# Patient Record
Sex: MALE | Race: BLACK OR AFRICAN AMERICAN | NOT HISPANIC OR LATINO | Employment: UNEMPLOYED | URBAN - METROPOLITAN AREA
[De-identification: names, ages, dates, MRNs, and addresses within clinical notes are randomized per-mention and may not be internally consistent; named-entity substitution may affect disease eponyms.]

---

## 2022-04-29 ENCOUNTER — TELEPHONE (OUTPATIENT)
Dept: SURGERY | Facility: CLINIC | Age: 62
End: 2022-04-29

## 2022-06-07 ENCOUNTER — OFFICE VISIT (OUTPATIENT)
Dept: SURGERY | Facility: CLINIC | Age: 62
End: 2022-06-07
Payer: OTHER GOVERNMENT

## 2022-06-07 VITALS
HEART RATE: 77 BPM | RESPIRATION RATE: 16 BRPM | DIASTOLIC BLOOD PRESSURE: 86 MMHG | TEMPERATURE: 98 F | BODY MASS INDEX: 30.62 KG/M2 | WEIGHT: 202 LBS | SYSTOLIC BLOOD PRESSURE: 128 MMHG | OXYGEN SATURATION: 96 % | HEIGHT: 68 IN

## 2022-06-07 DIAGNOSIS — K59.00 CONSTIPATION, UNSPECIFIED CONSTIPATION TYPE: ICD-10-CM

## 2022-06-07 DIAGNOSIS — Z87.898 HISTORY OF POSTOPERATIVE NAUSEA AND VOMITING: ICD-10-CM

## 2022-06-07 DIAGNOSIS — K42.9 UMBILICAL HERNIA WITHOUT OBSTRUCTION AND WITHOUT GANGRENE: Primary | ICD-10-CM

## 2022-06-07 PROCEDURE — 99214 OFFICE O/P EST MOD 30 MIN: CPT | Performed by: SURGERY

## 2022-06-07 NOTE — PROGRESS NOTES
Assessment/Plan:     1  Umbilical hernia without obstruction and without gangrene  -     Case request operating room: HERNIA REPAIR UMBILICAL LAPAROSCOPIC; Standing  -     Comprehensive metabolic panel; Future  -     CBC and differential; Future  -     EKG 12 lead; Future  -     Case request operating room: HERNIA REPAIR UMBILICAL LAPAROSCOPIC    2  History of postoperative nausea and vomiting    3  Constipation, unspecified constipation type        Add bid colace 100mg in addition to the fiber supplement he has been prescribed  I assume this is at least partially dietary  We discussed the risks and benefits of laparoscopic umbilical hernia repair including but not limited to mesh placement to decrease risk of recurrence, risk of bleeding, infection, damage to nerves or other structures, acute and chronic pain, recurrence, reactions to medications  Questions answered and consent signed  He will need clearance which can be faxed over from his primary at the facility  Labs/EKG ordered  Subjective:      Patient ID: Corey Karimi is a 63 y o  male  Triage Notes:    Mr Siva Horowitz is presenting with an umbilical hernia  He has noticed it about 3 months  He has stopped doing the heavy lifting since noticing this but did previously lift up to  pounds  No recent cough - quit smoking in February due to his incarceration  Does endorse constipation and a lot of straining despite starting fiber he is still having to strain a lot  The following portions of the patient's history were reviewed and updated as appropriate:   He  has no past medical history on file  He There are no problems to display for this patient  He  has a past surgical history that includes 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)  His family history is not on file  He  reports that he has quit smoking  His smoking use included cigarettes  He has never used smokeless tobacco  He reports previous alcohol use   He reports that he does not use drugs  Current Outpatient Medications on File Prior to Visit   Medication Sig    albuterol (PROVENTIL HFA,VENTOLIN HFA) 90 mcg/act inhaler Inhale 2 puffs every 6 (six) hours as needed for wheezing    psyllium (METAMUCIL) 58 6 % powder Take 1 packet by mouth 3 (three) times a day     No current facility-administered medications on file prior to visit  He has No Known Allergies       Review of Systems   Constitutional: Negative for appetite change, chills, fever and unexpected weight change  HENT: Negative for hearing loss, sore throat, trouble swallowing and voice change  Eyes: Negative for visual disturbance  Respiratory: Negative for cough, chest tightness, shortness of breath and wheezing  Cardiovascular: Negative for chest pain and palpitations  Gastrointestinal: Positive for constipation  Negative for abdominal distention and blood in stool  Endocrine: Negative for cold intolerance and heat intolerance  Genitourinary: Negative for difficulty urinating  Skin: Negative for color change and rash  Neurological: Negative for dizziness, speech difficulty, light-headedness and numbness  Hematological: Does not bruise/bleed easily  Psychiatric/Behavioral: Negative for confusion, hallucinations and suicidal ideas  Objective:      /86   Pulse 77   Temp 98 °F (36 7 °C) (Temporal)   Resp 16   Ht 5' 8" (1 727 m)   Wt 91 6 kg (202 lb)   SpO2 96%   BMI 30 71 kg/m²     Below is the patient's most recent value for Albumin, ALT, AST, BUN, Calcium, Chloride, Cholesterol, CO2, Creatinine, GFR, Glucose, HDL, Hematocrit, Hemoglobin, Hemoglobin A1C, LDL, Magnesium, Phosphorus, Platelets, Potassium, PSA, Sodium, Triglycerides, and WBC     No results found for: ALT, AST, BUN, CALCIUM, CL, CHOL, CO2, CREATININE, CREAT, GFRAA, GFRNONAA, HDL, HCT, HGB, HGBA1C, LDL, MG, PHOS, PLT, K, PSA, NA, TRIG, WBC  Note: for a comprehensive list of the patient's lab results, access the Results Review activity  Physical Exam  Vitals and nursing note reviewed  Constitutional:       General: He is not in acute distress  Appearance: He is well-developed  HENT:      Head: Normocephalic and atraumatic  Eyes:      General: No scleral icterus  Right eye: No discharge  Left eye: No discharge  Neck:      Vascular: No JVD  Cardiovascular:      Rate and Rhythm: Normal rate and regular rhythm  Pulmonary:      Effort: Pulmonary effort is normal  No respiratory distress  Abdominal:      General: There is no distension  Palpations: Abdomen is soft  Tenderness: There is no abdominal tenderness  There is no guarding  Hernia: A hernia (can admit two fingerbreaths  reduces supine ) is present  Musculoskeletal:         General: Normal range of motion  Cervical back: Normal range of motion and neck supple  Skin:     General: Skin is warm and dry  Neurological:      Mental Status: He is alert and oriented to person, place, and time  Psychiatric:         Mood and Affect: Mood normal          Behavior: Behavior normal          Thought Content:  Thought content normal          Judgment: Judgment normal              Procedures

## 2022-06-07 NOTE — H&P (VIEW-ONLY)
Assessment/Plan:     1  Umbilical hernia without obstruction and without gangrene  -     Case request operating room: HERNIA REPAIR UMBILICAL LAPAROSCOPIC; Standing  -     Comprehensive metabolic panel; Future  -     CBC and differential; Future  -     EKG 12 lead; Future  -     Case request operating room: HERNIA REPAIR UMBILICAL LAPAROSCOPIC    2  History of postoperative nausea and vomiting    3  Constipation, unspecified constipation type        Add bid colace 100mg in addition to the fiber supplement he has been prescribed  I assume this is at least partially dietary  We discussed the risks and benefits of laparoscopic umbilical hernia repair including but not limited to mesh placement to decrease risk of recurrence, risk of bleeding, infection, damage to nerves or other structures, acute and chronic pain, recurrence, reactions to medications  Questions answered and consent signed  He will need clearance which can be faxed over from his primary at the facility  Labs/EKG ordered  Subjective:      Patient ID: Laurent Milton is a 63 y o  male  Triage Notes:    Mr Ton Saleh is presenting with an umbilical hernia  He has noticed it about 3 months  He has stopped doing the heavy lifting since noticing this but did previously lift up to  pounds  No recent cough - quit smoking in February due to his incarceration  Does endorse constipation and a lot of straining despite starting fiber he is still having to strain a lot  The following portions of the patient's history were reviewed and updated as appropriate:   He  has no past medical history on file  He There are no problems to display for this patient  He  has a past surgical history that includes 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)  His family history is not on file  He  reports that he has quit smoking  His smoking use included cigarettes  He has never used smokeless tobacco  He reports previous alcohol use   He reports that he does not use drugs  Current Outpatient Medications on File Prior to Visit   Medication Sig    albuterol (PROVENTIL HFA,VENTOLIN HFA) 90 mcg/act inhaler Inhale 2 puffs every 6 (six) hours as needed for wheezing    psyllium (METAMUCIL) 58 6 % powder Take 1 packet by mouth 3 (three) times a day     No current facility-administered medications on file prior to visit  He has No Known Allergies       Review of Systems   Constitutional: Negative for appetite change, chills, fever and unexpected weight change  HENT: Negative for hearing loss, sore throat, trouble swallowing and voice change  Eyes: Negative for visual disturbance  Respiratory: Negative for cough, chest tightness, shortness of breath and wheezing  Cardiovascular: Negative for chest pain and palpitations  Gastrointestinal: Positive for constipation  Negative for abdominal distention and blood in stool  Endocrine: Negative for cold intolerance and heat intolerance  Genitourinary: Negative for difficulty urinating  Skin: Negative for color change and rash  Neurological: Negative for dizziness, speech difficulty, light-headedness and numbness  Hematological: Does not bruise/bleed easily  Psychiatric/Behavioral: Negative for confusion, hallucinations and suicidal ideas  Objective:      /86   Pulse 77   Temp 98 °F (36 7 °C) (Temporal)   Resp 16   Ht 5' 8" (1 727 m)   Wt 91 6 kg (202 lb)   SpO2 96%   BMI 30 71 kg/m²     Below is the patient's most recent value for Albumin, ALT, AST, BUN, Calcium, Chloride, Cholesterol, CO2, Creatinine, GFR, Glucose, HDL, Hematocrit, Hemoglobin, Hemoglobin A1C, LDL, Magnesium, Phosphorus, Platelets, Potassium, PSA, Sodium, Triglycerides, and WBC     No results found for: ALT, AST, BUN, CALCIUM, CL, CHOL, CO2, CREATININE, CREAT, GFRAA, GFRNONAA, HDL, HCT, HGB, HGBA1C, LDL, MG, PHOS, PLT, K, PSA, NA, TRIG, WBC  Note: for a comprehensive list of the patient's lab results, access the Results Review activity  Physical Exam  Vitals and nursing note reviewed  Constitutional:       General: He is not in acute distress  Appearance: He is well-developed  HENT:      Head: Normocephalic and atraumatic  Eyes:      General: No scleral icterus  Right eye: No discharge  Left eye: No discharge  Neck:      Vascular: No JVD  Cardiovascular:      Rate and Rhythm: Normal rate and regular rhythm  Pulmonary:      Effort: Pulmonary effort is normal  No respiratory distress  Abdominal:      General: There is no distension  Palpations: Abdomen is soft  Tenderness: There is no abdominal tenderness  There is no guarding  Hernia: A hernia (can admit two fingerbreaths  reduces supine ) is present  Musculoskeletal:         General: Normal range of motion  Cervical back: Normal range of motion and neck supple  Skin:     General: Skin is warm and dry  Neurological:      Mental Status: He is alert and oriented to person, place, and time  Psychiatric:         Mood and Affect: Mood normal          Behavior: Behavior normal          Thought Content:  Thought content normal          Judgment: Judgment normal              Procedures

## 2022-06-16 NOTE — PRE-PROCEDURE INSTRUCTIONS
Pre-Surgery Instructions:   Medication Instructions    albuterol (PROVENTIL HFA,VENTOLIN HFA) 90 mcg/act inhaler Uses PRN- OK to take day of surgery    psyllium (METAMUCIL) 58 6 % powder Hold day of surgery  You will receive a phone call from hospital for arrival time  Please call surgeons office if any changes in your condition  Wear easy on/off clothing; consider type of surgery;  Valuables, jewelry, piercing's please keep at home  **COVID-19  education/surgical guidelines  Updated covid    Visitation policy  Please: No contact lenses or eye make up, artificial eyelashes    Please secure transportation     Follow pre surgery showering or cleaning instructions as  Reviewed by nurse or surgeons office      Questions answered and concerns addressed

## 2022-06-22 ENCOUNTER — HOSPITAL ENCOUNTER (OUTPATIENT)
Facility: HOSPITAL | Age: 62
Setting detail: OUTPATIENT SURGERY
Discharge: HOME | End: 2022-06-22
Attending: SURGERY | Admitting: SURGERY
Payer: OTHER GOVERNMENT

## 2022-06-22 ENCOUNTER — ANESTHESIA EVENT (OUTPATIENT)
Dept: PERIOP | Facility: HOSPITAL | Age: 62
End: 2022-06-22
Payer: OTHER GOVERNMENT

## 2022-06-22 ENCOUNTER — ANESTHESIA (OUTPATIENT)
Dept: PERIOP | Facility: HOSPITAL | Age: 62
End: 2022-06-22
Payer: OTHER GOVERNMENT

## 2022-06-22 ENCOUNTER — HOSPITAL ENCOUNTER (EMERGENCY)
Facility: HOSPITAL | Age: 62
Discharge: HOME | End: 2022-06-22
Attending: EMERGENCY MEDICINE
Payer: OTHER GOVERNMENT

## 2022-06-22 VITALS
WEIGHT: 209.44 LBS | OXYGEN SATURATION: 93 % | TEMPERATURE: 97.4 F | HEART RATE: 86 BPM | DIASTOLIC BLOOD PRESSURE: 78 MMHG | SYSTOLIC BLOOD PRESSURE: 146 MMHG | BODY MASS INDEX: 31.74 KG/M2 | RESPIRATION RATE: 18 BRPM | HEIGHT: 68 IN

## 2022-06-22 VITALS
OXYGEN SATURATION: 100 % | SYSTOLIC BLOOD PRESSURE: 151 MMHG | WEIGHT: 210 LBS | HEART RATE: 96 BPM | TEMPERATURE: 98.7 F | BODY MASS INDEX: 31.83 KG/M2 | HEIGHT: 68 IN | RESPIRATION RATE: 18 BRPM | DIASTOLIC BLOOD PRESSURE: 81 MMHG

## 2022-06-22 DIAGNOSIS — K43.9 VENTRAL HERNIA WITHOUT OBSTRUCTION OR GANGRENE: Primary | ICD-10-CM

## 2022-06-22 DIAGNOSIS — G89.18 POSTOPERATIVE PAIN: Primary | ICD-10-CM

## 2022-06-22 PROBLEM — Z98.890 PONV (POSTOPERATIVE NAUSEA AND VOMITING): Status: ACTIVE | Noted: 2022-06-22

## 2022-06-22 PROBLEM — R11.2 PONV (POSTOPERATIVE NAUSEA AND VOMITING): Status: ACTIVE | Noted: 2022-06-22

## 2022-06-22 PROCEDURE — 99024 POSTOP FOLLOW-UP VISIT: CPT

## 2022-06-22 PROCEDURE — 96372 THER/PROPH/DIAG INJ SC/IM: CPT

## 2022-06-22 PROCEDURE — 49652 PR LAP, VENTRAL HERNIA REPAIR,REDUCIBLE: CPT | Performed by: SURGERY

## 2022-06-22 PROCEDURE — C1781 MESH (IMPLANTABLE): HCPCS | Performed by: SURGERY

## 2022-06-22 PROCEDURE — 99284 EMERGENCY DEPT VISIT MOD MDM: CPT | Performed by: PHYSICIAN ASSISTANT

## 2022-06-22 PROCEDURE — 99284 EMERGENCY DEPT VISIT MOD MDM: CPT

## 2022-06-22 DEVICE — VENTRALIGHT ST MESH
Type: IMPLANTABLE DEVICE | Site: ABDOMEN | Status: FUNCTIONAL
Brand: VENTRALIGHT ST

## 2022-06-22 DEVICE — FIXATION SECURESTRAP 5 MM ABSORB DISP: Type: IMPLANTABLE DEVICE | Site: ABDOMEN | Status: FUNCTIONAL

## 2022-06-22 RX ORDER — PROMETHAZINE HYDROCHLORIDE 25 MG/ML
25 INJECTION, SOLUTION INTRAMUSCULAR; INTRAVENOUS ONCE AS NEEDED
Status: DISCONTINUED | OUTPATIENT
Start: 2022-06-22 | End: 2022-06-22 | Stop reason: HOSPADM

## 2022-06-22 RX ORDER — ACETAMINOPHEN 325 MG/1
650 TABLET ORAL EVERY 6 HOURS PRN
Status: ON HOLD | COMMUNITY
End: 2022-06-22 | Stop reason: SDUPTHER

## 2022-06-22 RX ORDER — HYDROCODONE BITARTRATE AND ACETAMINOPHEN 5; 325 MG/1; MG/1
1 TABLET ORAL EVERY 6 HOURS PRN
Qty: 16 TABLET | Refills: 0 | Status: SHIPPED | OUTPATIENT
Start: 2022-06-22 | End: 2022-06-26

## 2022-06-22 RX ORDER — FENTANYL CITRATE/PF 50 MCG/ML
50 SYRINGE (ML) INJECTION
Status: DISCONTINUED | OUTPATIENT
Start: 2022-06-22 | End: 2022-06-22 | Stop reason: HOSPADM

## 2022-06-22 RX ORDER — LIDOCAINE HYDROCHLORIDE 10 MG/ML
INJECTION, SOLUTION EPIDURAL; INFILTRATION; INTRACAUDAL; PERINEURAL AS NEEDED
Status: DISCONTINUED | OUTPATIENT
Start: 2022-06-22 | End: 2022-06-22

## 2022-06-22 RX ORDER — KETOROLAC TROMETHAMINE 30 MG/ML
30 INJECTION, SOLUTION INTRAMUSCULAR; INTRAVENOUS ONCE
Status: COMPLETED | OUTPATIENT
Start: 2022-06-22 | End: 2022-06-22

## 2022-06-22 RX ORDER — PHENYLEPHRINE HYDROCHLORIDE 10 MG/ML
INJECTION INTRAVENOUS AS NEEDED
Status: DISCONTINUED | OUTPATIENT
Start: 2022-06-22 | End: 2022-06-22

## 2022-06-22 RX ORDER — PHENYLEPHRINE HCL 10 MG/1
10 TABLET, FILM COATED ORAL EVERY 4 HOURS PRN
COMMUNITY

## 2022-06-22 RX ORDER — GLYCOPYRROLATE 0.2 MG/ML
INJECTION INTRAMUSCULAR; INTRAVENOUS AS NEEDED
Status: DISCONTINUED | OUTPATIENT
Start: 2022-06-22 | End: 2022-06-22

## 2022-06-22 RX ORDER — CYCLOBENZAPRINE HCL 5 MG
5 TABLET ORAL 3 TIMES DAILY PRN
Qty: 15 TABLET | Refills: 0 | Status: SHIPPED | OUTPATIENT
Start: 2022-06-22 | End: 2022-06-26

## 2022-06-22 RX ORDER — HYDROMORPHONE HCL 110MG/55ML
PATIENT CONTROLLED ANALGESIA SYRINGE INTRAVENOUS AS NEEDED
Status: DISCONTINUED | OUTPATIENT
Start: 2022-06-22 | End: 2022-06-22

## 2022-06-22 RX ORDER — DEXAMETHASONE SODIUM PHOSPHATE 10 MG/ML
INJECTION, SOLUTION INTRAMUSCULAR; INTRAVENOUS AS NEEDED
Status: DISCONTINUED | OUTPATIENT
Start: 2022-06-22 | End: 2022-06-22

## 2022-06-22 RX ORDER — METOCLOPRAMIDE HYDROCHLORIDE 5 MG/ML
10 INJECTION INTRAMUSCULAR; INTRAVENOUS ONCE AS NEEDED
Status: DISCONTINUED | OUTPATIENT
Start: 2022-06-22 | End: 2022-06-22 | Stop reason: HOSPADM

## 2022-06-22 RX ORDER — SODIUM CHLORIDE, SODIUM LACTATE, POTASSIUM CHLORIDE, CALCIUM CHLORIDE 600; 310; 30; 20 MG/100ML; MG/100ML; MG/100ML; MG/100ML
75 INJECTION, SOLUTION INTRAVENOUS CONTINUOUS
Status: DISCONTINUED | OUTPATIENT
Start: 2022-06-22 | End: 2022-06-22 | Stop reason: HOSPADM

## 2022-06-22 RX ORDER — ACETAMINOPHEN 325 MG/1
325 TABLET ORAL EVERY 6 HOURS PRN
Qty: 30 TABLET | Refills: 0
Start: 2022-06-22

## 2022-06-22 RX ORDER — MAGNESIUM HYDROXIDE 1200 MG/15ML
LIQUID ORAL AS NEEDED
Status: DISCONTINUED | OUTPATIENT
Start: 2022-06-22 | End: 2022-06-22 | Stop reason: HOSPADM

## 2022-06-22 RX ORDER — DIPHENHYDRAMINE HYDROCHLORIDE 50 MG/ML
12.5 INJECTION INTRAMUSCULAR; INTRAVENOUS ONCE AS NEEDED
Status: DISCONTINUED | OUTPATIENT
Start: 2022-06-22 | End: 2022-06-22 | Stop reason: HOSPADM

## 2022-06-22 RX ORDER — FENTANYL CITRATE 50 UG/ML
INJECTION, SOLUTION INTRAMUSCULAR; INTRAVENOUS AS NEEDED
Status: DISCONTINUED | OUTPATIENT
Start: 2022-06-22 | End: 2022-06-22

## 2022-06-22 RX ORDER — CHLORPHENIRAMINE MALEATE 4 MG/1
4 TABLET ORAL EVERY 6 HOURS PRN
COMMUNITY

## 2022-06-22 RX ORDER — HYDROMORPHONE HCL/PF 1 MG/ML
0.5 SYRINGE (ML) INJECTION
Status: DISCONTINUED | OUTPATIENT
Start: 2022-06-22 | End: 2022-06-22 | Stop reason: HOSPADM

## 2022-06-22 RX ORDER — DOCUSATE SODIUM 100 MG/1
100 CAPSULE, LIQUID FILLED ORAL 2 TIMES DAILY
Qty: 30 CAPSULE | Refills: 0 | Status: SHIPPED | OUTPATIENT
Start: 2022-06-22

## 2022-06-22 RX ORDER — HYDROCODONE BITARTRATE AND ACETAMINOPHEN 5; 325 MG/1; MG/1
1 TABLET ORAL ONCE AS NEEDED
Status: COMPLETED | OUTPATIENT
Start: 2022-06-22 | End: 2022-06-22

## 2022-06-22 RX ORDER — PROPOFOL 10 MG/ML
INJECTION, EMULSION INTRAVENOUS AS NEEDED
Status: DISCONTINUED | OUTPATIENT
Start: 2022-06-22 | End: 2022-06-22

## 2022-06-22 RX ORDER — NEOSTIGMINE METHYLSULFATE 1 MG/ML
INJECTION INTRAVENOUS AS NEEDED
Status: DISCONTINUED | OUTPATIENT
Start: 2022-06-22 | End: 2022-06-22

## 2022-06-22 RX ORDER — CYCLOBENZAPRINE HCL 5 MG
5 TABLET ORAL ONCE AS NEEDED
Status: COMPLETED | OUTPATIENT
Start: 2022-06-22 | End: 2022-06-22

## 2022-06-22 RX ORDER — CEFAZOLIN SODIUM 2 G/50ML
2000 SOLUTION INTRAVENOUS ONCE
Status: COMPLETED | OUTPATIENT
Start: 2022-06-22 | End: 2022-06-22

## 2022-06-22 RX ORDER — MIDAZOLAM HYDROCHLORIDE 2 MG/2ML
INJECTION, SOLUTION INTRAMUSCULAR; INTRAVENOUS AS NEEDED
Status: DISCONTINUED | OUTPATIENT
Start: 2022-06-22 | End: 2022-06-22

## 2022-06-22 RX ORDER — EPHEDRINE SULFATE 50 MG/ML
INJECTION INTRAVENOUS AS NEEDED
Status: DISCONTINUED | OUTPATIENT
Start: 2022-06-22 | End: 2022-06-22

## 2022-06-22 RX ORDER — IPRATROPIUM BROMIDE AND ALBUTEROL SULFATE 2.5; .5 MG/3ML; MG/3ML
3 SOLUTION RESPIRATORY (INHALATION) ONCE
Status: COMPLETED | OUTPATIENT
Start: 2022-06-22 | End: 2022-06-22

## 2022-06-22 RX ORDER — ONDANSETRON 2 MG/ML
INJECTION INTRAMUSCULAR; INTRAVENOUS AS NEEDED
Status: DISCONTINUED | OUTPATIENT
Start: 2022-06-22 | End: 2022-06-22

## 2022-06-22 RX ORDER — ROCURONIUM BROMIDE 10 MG/ML
INJECTION, SOLUTION INTRAVENOUS AS NEEDED
Status: DISCONTINUED | OUTPATIENT
Start: 2022-06-22 | End: 2022-06-22

## 2022-06-22 RX ADMIN — CYCLOBENZAPRINE HYDROCHLORIDE 5 MG: 5 TABLET, FILM COATED ORAL at 10:56

## 2022-06-22 RX ADMIN — KETOROLAC TROMETHAMINE 30 MG: 30 INJECTION, SOLUTION INTRAMUSCULAR at 16:43

## 2022-06-22 RX ADMIN — NEOSTIGMINE METHYLSULFATE 3 MG: 1 INJECTION INTRAVENOUS at 09:21

## 2022-06-22 RX ADMIN — ROCURONIUM BROMIDE 50 MG: 50 INJECTION, SOLUTION INTRAVENOUS at 07:58

## 2022-06-22 RX ADMIN — DEXAMETHASONE SODIUM PHOSPHATE 10 MG: 10 INJECTION INTRAMUSCULAR; INTRAVENOUS at 08:01

## 2022-06-22 RX ADMIN — EPHEDRINE SULFATE 10 MG: 50 INJECTION, SOLUTION INTRAVENOUS at 08:28

## 2022-06-22 RX ADMIN — SODIUM CHLORIDE, POTASSIUM CHLORIDE, SODIUM LACTATE AND CALCIUM CHLORIDE 75 ML/HR: 600; 310; 30; 20 INJECTION, SOLUTION INTRAVENOUS at 07:03

## 2022-06-22 RX ADMIN — EPHEDRINE SULFATE 10 MG: 50 INJECTION, SOLUTION INTRAVENOUS at 08:55

## 2022-06-22 RX ADMIN — MIDAZOLAM HYDROCHLORIDE 2 MG: 1 INJECTION, SOLUTION INTRAMUSCULAR; INTRAVENOUS at 07:55

## 2022-06-22 RX ADMIN — IPRATROPIUM BROMIDE AND ALBUTEROL SULFATE 3 ML: 2.5; .5 SOLUTION RESPIRATORY (INHALATION) at 09:49

## 2022-06-22 RX ADMIN — CEFAZOLIN SODIUM 2000 MG: 2 SOLUTION INTRAVENOUS at 07:44

## 2022-06-22 RX ADMIN — PROPOFOL 50 MG: 10 INJECTION, EMULSION INTRAVENOUS at 09:08

## 2022-06-22 RX ADMIN — PROPOFOL 200 MG: 10 INJECTION, EMULSION INTRAVENOUS at 07:58

## 2022-06-22 RX ADMIN — GLYCOPYRROLATE 0.6 MG: 0.2 INJECTION, SOLUTION INTRAMUSCULAR; INTRAVENOUS at 09:21

## 2022-06-22 RX ADMIN — PHENYLEPHRINE HYDROCHLORIDE 50 MCG/MIN: 10 INJECTION INTRAVENOUS at 08:24

## 2022-06-22 RX ADMIN — FENTANYL CITRATE 50 MCG: 50 INJECTION, SOLUTION INTRAMUSCULAR; INTRAVENOUS at 09:08

## 2022-06-22 RX ADMIN — FENTANYL CITRATE 50 MCG: 50 INJECTION, SOLUTION INTRAMUSCULAR; INTRAVENOUS at 07:57

## 2022-06-22 RX ADMIN — ONDANSETRON 4 MG: 2 INJECTION INTRAMUSCULAR; INTRAVENOUS at 09:11

## 2022-06-22 RX ADMIN — HYDROMORPHONE HYDROCHLORIDE 0.5 MG: 2 INJECTION, SOLUTION INTRAMUSCULAR; INTRAVENOUS; SUBCUTANEOUS at 09:21

## 2022-06-22 RX ADMIN — PHENYLEPHRINE HYDROCHLORIDE 200 MCG: 10 INJECTION INTRAVENOUS at 08:16

## 2022-06-22 RX ADMIN — FENTANYL CITRATE 50 MCG: 50 INJECTION INTRAMUSCULAR; INTRAVENOUS at 10:02

## 2022-06-22 RX ADMIN — FENTANYL CITRATE 50 MCG: 50 INJECTION INTRAMUSCULAR; INTRAVENOUS at 10:13

## 2022-06-22 RX ADMIN — HYDROCODONE BITARTRATE AND ACETAMINOPHEN 1 TABLET: 5; 325 TABLET ORAL at 10:56

## 2022-06-22 RX ADMIN — PHENYLEPHRINE HYDROCHLORIDE 200 MCG: 10 INJECTION INTRAVENOUS at 08:21

## 2022-06-22 RX ADMIN — LIDOCAINE HYDROCHLORIDE 50 MG: 10 INJECTION, SOLUTION EPIDURAL; INFILTRATION; INTRACAUDAL at 07:58

## 2022-06-22 RX ADMIN — PHENYLEPHRINE HYDROCHLORIDE 200 MCG: 10 INJECTION INTRAVENOUS at 08:04

## 2022-06-22 RX ADMIN — PHENYLEPHRINE HYDROCHLORIDE 200 MCG: 10 INJECTION INTRAVENOUS at 08:24

## 2022-06-22 NOTE — OP NOTE
OPERATIVE REPORT  PATIENT NAME: Coni Steiner    :  1960  MRN: [id number]  Pt Location: [facility] OR ROOM 02    SURGERY DATE: 2022    Surgeon(s) and Role:     * Guy Soto MD - Primary     * Lazarus Zapien PA-C - Assisting    Preop Diagnosis:  Umbilical hernia without obstruction and without gangrene [K42 9]    Post-Op Diagnosis Codes:     * Umbilical hernia without obstruction and without gangrene [K42 9]  Epigastric/ventral hernia with incarceration    Procedure(s) (LRB):  HERNIA REPAIR UMBILICAL , EPIGASTRIC LAPAROSCOPIC (N/A)    Specimen(s):  * No specimens in log *    Estimated Blood Loss:   Minimal    Drains:  * No LDAs found *    Anesthesia Type:   General    Operative Indications:  Umbilical hernia without obstruction and without gangrene [K42 9]      Operative Findings: In addition to the known 2 cm umbilical hernia there was also a 2 cm epigastric hernia containing incarcerated omentum     Complications:   None    Procedure and Technique:      The patient was seen in preop holding and the procedure reviewed  The patient was then brought to the operating room and placed in supine position  The patient was intubated and sedated  A preincision time out confirmed the patient, procedure and preop antibiotics  The abdomen was prepped and draped  Using a veress in the left upper quadrant pneumoperitoneum was established  The abdomen was insufflated  Additional 5 mm ports were placed in the right upper and right lower quadrants  Upon inspection there was the known circular 2 5 cm umbilical hernia and in the epigastrium a 2 5 cm hernia which contained incarcerated omentum  Using graspers the fat within the hernia was reduced and the hook electrocautery was used to lyse adhesions to the sac  There was about 4 cm of normal fascia intervening the two defects making the total area 9 cm and thus the oval 20 x 15 cm mesh was selected   An incision was made between the two defects to facilitate closing the two defects which was done using interrupted neurolon sutures  Neurolon sutures were placed in a clockface around the mesh at 12, 3, 6 and 9 oclock positions  The mesh was rolled and placed within the abdomen using this incision over the defects  The sites of the sutures were measured with even overlap on all sides (>5 cm on all sides)  These were passed with 1 cm of intervening fascia using the suture passer  The absorbable tacker was used to affix the mesh circumferentially and allow it to lie flat against the abdominal wall  The abdomen was then desufflated and the sutures were tied  The incisions were closed with 4-0 monocryl and covered with steris, gauze and tegaderm      I was present for the entire procedure, A qualified resident physician was not available, and A physician assistant was required during the procedure for retraction tissue handling,dissection and suturing    Patient Disposition:  PACU  and extubated and stable      SIGNATURE: Peter Clemente MD  DATE: June 22, 2022  TIME: 9:28 AM

## 2022-06-22 NOTE — DISCHARGE INSTRUCTIONS
Laparoscopic Herniorrhaphy   WHAT YOU NEED TO KNOW:   Laparoscopic herniorrhaphy is surgery to repair a hernia  Laparoscopic surgery is done through small incisions  DISCHARGE INSTRUCTIONS:   Call your local emergency number (911 in the [address] [facility]) if:   You feel lightheaded, short of breath, and have chest pain  You cough up blood  Seek care immediately if:   Your arm or leg feels warm, tender, and painful  It may look swollen and red  You have blood clots or fluid around your surgery site  You have pain in your groin or surgery site that does not get better after you take pain medicine  You have trouble urinating  You notice a new lump at your surgery site  You suddenly have numbness in your groin area  Call your doctor or surgeon if:   You are bleeding more than expected from your surgery site  You have a fever  Your surgery site is swollen, red, or has pus coming from it  You have questions or concerns about your condition or care  Medicines:   Prescription pain medicine  may be given  Ask your healthcare provider how to take this medicine safely  Some prescription pain medicines contain acetaminophen  Do not take other medicines that contain acetaminophen without talking to your healthcare provider  Too much acetaminophen may cause liver damage  Prescription pain medicine may cause constipation  Ask your healthcare provider how to prevent or treat constipation  Continue taking the colace 100mg bid  Miralax is laxative that can be used as needed if you skip a day or having a bowel movement is very difficult  Please avoid straining  Take your medicine as directed  Contact your healthcare provider if you think your medicine is not helping or if you have side effects  Tell him or her if you are allergic to any medicine  Keep a list of the medicines, vitamins, and herbs you take  Include the amounts, and when and why you take them   Bring the list or the pill bottles to follow-up visits  Carry your medicine list with you in case of an emergency  Activity:  You need to avoid heavy lifting until your surgery site heals  Ask your healthcare provider when you may return to your normal activities  Do not do strenuous exercise until your healthcare provider says it is okay  You should walk several times per day  Prevent or manage constipation:  Eat high-fiber foods and drink more liquids  Some foods high in fiber include fruit, vegetables, and whole grains  Healthcare providers may give you a stool softener to help make your bowel movements softer and more regular  Bandage: You may have a bandage over your surgery site when you leave the hospital  The smallest incisions have guaze and tape over the incisions and this guaze can be removed the day after surgery before showering  The largest incision has a special antibiotic dressing and that can be used up to 5 days after surgery  Follow up with your doctor or surgeon as directed:  Write down your questions so you remember to ask them during your visits  © Copyright [employer] 2022 Information is for End User's use only and may not be sold, redistributed or otherwise used for commercial purposes  All illustrations and images included in CareNotes® are the copyrighted property of [email or web address]  or [employer]   The above information is an  only  It is not intended as medical advice for individual conditions or treatments  Talk to your doctor, nurse or pharmacist before following any medical regimen to see if it is safe and effective for you

## 2022-06-22 NOTE — ANESTHESIA POSTPROCEDURE EVALUATION
Post-Op Assessment Note    CV Status:  Stable  Pain Score: 0    Pain management: adequate     Mental Status:  Arousable and sleepy   Hydration Status:  Euvolemic   PONV Controlled:  Controlled   Airway Patency:  Patent      Post Op Vitals Reviewed: Yes      Staff: CRNA         No complications documented      BP (!) 175/99 (06/22/22 0937)    Temp 97 7 °F (36 5 °C) (06/22/22 0937)    Pulse 94 (06/22/22 0937)   Resp 20 (06/22/22 0937)    SpO2 98 % (06/22/22 0937)

## 2022-06-22 NOTE — ANESTHESIA PREPROCEDURE EVALUATION
Procedure: HERNIA REPAIR UMBILICAL LAPAROSCOPIC (N/A Abdomen)    Relevant Problems   ANESTHESIA   (+) PONV (postoperative nausea and vomiting)        Physical Exam    Airway    Mallampati score: I  TM Distance: >3 FB  Neck ROM: full     Dental       Cardiovascular  Cardiovascular exam normal    Pulmonary  Pulmonary exam normal     Other Findings        Anesthesia Plan  ASA Score- 2     Anesthesia Type- general with ASA Monitors  Additional Monitors:   Airway Plan: ETT  Plan Factors-Exercise tolerance (METS): >4 METS  Chart reviewed  Existing labs reviewed  Patient summary reviewed  Induction- intravenous  Postoperative Plan- Plan for postoperative opioid use  Planned trial extubation    Informed Consent- Anesthetic plan and risks discussed with patient  I personally reviewed this patient with the CRNA  Discussed and agreed on the Anesthesia Plan with the CRNA  Wei Aidan Greenwood is a 63 y o  presenting today for lap umbilical hernia  NPO since last night   Discussed risk and benefits of general which include and are not limited to: MI, stroke, sore throat, PONV, post- op pain  No new symptoms of  CP, SOB, F, chills, N/V, dizziness, numbness or tingling, cough, and other symptoms except as noted  AQA  Consented  History of anesthesia:  no personal issues/family issues

## 2022-06-22 NOTE — ED PROVIDER NOTES
History  Chief Complaint   Patient presents with    Abdominal Pain     Patient states he was here this morning for a hernia repair surgery  Per patient he was told [facility] that are not hearing any bowel sounds  Patient is c/o lower abdominal pain     67yo male with a history of asthma presenting for evaluation of abdominal pain  Patient underwent laparoscopic periumbilical hernia repair with Dr Fermin Quintanilla this morning  He was discharged from the hospital about 2 5 hours ago  He was [facility] back to the prison in which he [facility]  The medical staff at the prison was concerned because he hasn't passed any flatus or had a bowel movement since the surgery  The medical staff reportedly couldn't hear any bowel sounds and sent the patient back to the hospital  He currently complains of lower abdominal discomfort  He has urinated multiple times since the surgery and denies any retention  He is otherwise asymptomatic and denies any fevers or vomiting  History provided by:  Patient and medical records   used: No    Abdominal Pain  Pain location:  Suprapubic  Pain quality: aching    Pain radiates to:  Does not radiate  Pain severity:  Moderate  Onset quality:  Gradual  Timing:  Constant  Progression:  Unchanged  Chronicity:  New  Relieved by:  Nothing  Worsened by:  Nothing  Associated symptoms: constipation    Associated symptoms: no chest pain, no chills, no dysuria, no fever, no nausea, no shortness of breath and no vomiting        Prior to Admission Medications   Prescriptions Last Dose Informant Patient Reported? Taking?    HYDROcodone-acetaminophen (NORCO) 5-325 mg per tablet   No No   Sig: Take 1 tablet by mouth every 6 (six) hours as needed for pain for up to 4 days Max Daily Amount: 4 tablets   acetaminophen (TYLENOL) 325 mg tablet   No No   Sig: Take 1 tablet (325 mg total) by mouth every 6 (six) hours as needed for mild pain   albuterol (PROVENTIL HFA,VENTOLIN HFA) 90 mcg/act inhaler  Outside Facility (Specify) Yes No   Sig: Inhale 2 puffs every 6 (six) hours as needed for wheezing   chlorpheniramine (CHLOR-TRIMETON) 4 MG tablet  Outside Facility (Specify) Yes No   Sig: Take 4 mg by mouth every 6 (six) hours as needed for allergies   cyclobenzaprine (FLEXERIL) 5 mg tablet   No No   Sig: Take 1 tablet (5 mg total) by mouth 3 (three) times a day as needed for muscle spasms for up to 4 days   docusate sodium (COLACE) 100 mg capsule   No No   Sig: Take 1 capsule (100 mg total) by mouth 2 (two) times a day   guaiFENesin (HYTUSS PO)  Outside Facility (Specify) Yes No   Sig: Take 200 mg by mouth 2 (two) times a day as needed   phenylephrine (SUDAFED PE) 10 MG TABS  Outside Facility (Specify) Yes No   Sig: Take 10 mg by mouth every 4 (four) hours as needed for congestion   psyllium (METAMUCIL) 58 6 % powder  Outside Facility (Specify) Yes No   Sig: Take 1 packet by mouth 3 (three) times a day      Facility-Administered Medications: None       Past Medical History:   Diagnosis Date    Asthma        Past Surgical History:   Procedure Laterality Date    RESECTION SOFT TISSUE TUMOR THIGH RADICAL Bilateral        History reviewed  No pertinent family history  I have reviewed and agree with the history as documented  E-Cigarette/Vaping    E-Cigarette Use Never User      E-Cigarette/Vaping Substances     Social History     Tobacco Use    Smoking status: Former Smoker     Types: Cigarettes    Smokeless tobacco: Never Used   Vaping Use    Vaping Use: Never used   Substance Use Topics    Alcohol use: Not Currently    Drug use: Never       Review of Systems   Constitutional: Negative for chills and fever  HENT: Negative for drooling and voice change  Eyes: Negative for discharge and redness  Respiratory: Negative for shortness of breath and stridor  Cardiovascular: Negative for chest pain and leg swelling  Gastrointestinal: Positive for abdominal pain and constipation  Negative for nausea and vomiting  Genitourinary: Negative for dysuria  Musculoskeletal: Negative for neck pain and neck stiffness  Skin: Negative for color change and rash  Neurological: Negative for seizures and syncope  Psychiatric/Behavioral: Negative for confusion  The patient is not nervous/anxious  All other systems reviewed and are negative  Physical Exam  Physical Exam  Vitals and nursing note reviewed  Constitutional:       General: He is not in acute distress  Appearance: He is well-developed  He is not diaphoretic  HENT:      Head: Normocephalic and atraumatic  Right Ear: External ear normal       Left Ear: External ear normal    Eyes:      General: No scleral icterus  Right eye: No discharge  Left eye: No discharge  Conjunctiva/sclera: Conjunctivae normal    Cardiovascular:      Rate and Rhythm: Normal rate and regular rhythm  Heart sounds: Normal heart sounds  No murmur heard  Pulmonary:      Effort: Pulmonary effort is normal  No respiratory distress  Breath sounds: Normal breath sounds  No stridor  No wheezing or rales  Abdominal:      General: Bowel sounds are normal  There is no distension  Palpations: Abdomen is soft  Tenderness: There is abdominal tenderness in the suprapubic area  There is no guarding or rebound  Comments: Surgical dressings clean and dry  Abdomen is soft, non-distended  There is mild tenderness in the lower abdomen without guarding or rigidity  +Bowel sounds  Musculoskeletal:         General: No deformity  Normal range of motion  Cervical back: Normal range of motion and neck supple  Skin:     General: Skin is warm and dry  Neurological:      Mental Status: He is alert  He is not disoriented  GCS: GCS eye subscore is 4  GCS verbal subscore is 5  GCS motor subscore is 6     Psychiatric:         Behavior: Behavior normal          Vital Signs  ED Triage Vitals [06/22/22 1425] Temperature Pulse Respirations Blood Pressure SpO2   98 7 °F (37 1 °C) 96 18 151/81 100 %      Temp Source Heart Rate Source Patient Position - Orthostatic VS BP Location FiO2 (%)   Oral Monitor Sitting Right arm --      Pain Score       --           Vitals:    06/22/22 1425   BP: 151/81   Pulse: 96   Patient Position - Orthostatic VS: Sitting         Visual Acuity      ED Medications  Medications   ketorolac (TORADOL) injection 30 mg (30 mg Intramuscular Given 6/22/22 1643)       Diagnostic Studies  Results Reviewed     None                 No orders to display              Procedures  Procedures         ED Course  ED Course as of 06/22/22 2042 Wed Jun 22, 2022   1433 General surgery paged  8119 Patient evaluated by surgical AP, Eileen Wade  Surgery team said patient may be discharged, pain expected given size of mesh  Bowel function should return in about a day or so  Patient agreeable to plan  SBIRT 20yo+    Flowsheet Row Most Recent Value   SBIRT (25 yo +)    In order to provide better care to our patients, we are screening all of our patients for alcohol and drug use  Would it be okay to ask you these screening questions? Unable to answer at this time Filed at: 06/22/2022 1427                    MDM  Number of Diagnoses or Management Options  Postoperative pain: new and does not require workup  Diagnosis management comments: 67yo male presenting for abdominal pain  He underwent an umbilical hernia repair this morning  He was discharged back to the [facility] which she resides in  The medical staff at the [facility] apparently could not bowel sounds and he has not any flatus since surgery is patient complains of lower abdominal pain  He is urinating without issue  Patient is afebrile and hemodynamically stable  There are bowel sounds present  He has appropriate incisional tenderness on exam  Abdomen soft without guarding or rigidity  Patient evaluated by surgery team at bedside   He may be discharged per general surgery  No indication for further workup  Bowel function expected to return in the day next or so and there are bowel sounds on exam  Advised close follow-up with his surgeon  ED return precautions discussed  Patient expressed understanding and is agreeable to plan  Patient discharged in stable condition  Amount and/or Complexity of Data Reviewed  Review and summarize past medical records: yes  Discuss the patient with other providers: yes    Risk of Complications, Morbidity, and/or Mortality  Presenting problems: moderate  Diagnostic procedures: low  Management options: low    Patient Progress  Patient progress: stable      Disposition  Final diagnoses:   Postoperative pain     Time reflects when diagnosis was documented in both MDM as applicable and the Disposition within this note     Time User Action Codes Description Comment    6/22/2022  3:58  [facility] [place] [G89 18] Postoperative pain       ED Disposition     ED Disposition   Discharge    Condition   Stable    Date/Time   Wed Jun 22, 2022  4:07 PM    Comment   Delvis Ramos discharge to home/self care                 Follow-up Information     Follow up With Specialties Details Why Contact Info Additional Information    Carl Salazar MD General Surgery Schedule an appointment as soon as possible for a visit   Elmer Alvarado [address] [place] [address] 49 [phone] [facility] Emergency Department Emergency Medicine  If symptoms worsen [address] Emergency Department, [address] [place] [address], [facility]          Discharge Medication List as of 6/22/2022  4:26 PM      CONTINUE these medications which have NOT CHANGED    Details   acetaminophen (TYLENOL) 325 mg tablet Take 1 tablet (325 mg total) by mouth every 6 (six) hours as needed for mild pain, Starting Wed 6/22/2022, No Print      albuterol (PROVENTIL HFA,VENTOLIN HFA) 90 mcg/act inhaler Inhale 2 puffs every 6 (six) hours as needed for wheezing, Historical Med      chlorpheniramine (CHLOR-TRIMETON) 4 MG tablet Take 4 mg by mouth every 6 (six) hours as needed for allergies, Historical Med      cyclobenzaprine (FLEXERIL) 5 mg tablet Take 1 tablet (5 mg total) by mouth 3 (three) times a day as needed for muscle spasms for up to 4 days, Starting Wed 6/22/2022, Until Sun 6/26/2022 at 2359, Print      docusate sodium (COLACE) 100 mg capsule Take 1 capsule (100 mg total) by mouth 2 (two) times a day, Starting Wed 6/22/2022, Print      guaiFENesin (HYTUSS PO) Take 200 mg by mouth 2 (two) times a day as needed, Historical Med      HYDROcodone-acetaminophen (NORCO) 5-325 mg per tablet Take 1 tablet by mouth every 6 (six) hours as needed for pain for up to 4 days Max Daily Amount: 4 tablets, Starting Wed 6/22/2022, Until Sun 6/26/2022 at 2359, Print      phenylephrine (SUDAFED PE) 10 MG TABS Take 10 mg by mouth every 4 (four) hours as needed for congestion, Historical Med      psyllium (METAMUCIL) 58 6 % powder Take 1 packet by mouth 3 (three) times a day, Historical Med             No discharge procedures on file      PDMP Review     None          ED Provider  Electronically Signed by           Natan Burdick PA-C  06/22/22 2055

## 2022-06-22 NOTE — CONSULTS
Consult- General Surgery   Kat Crawley 63 y o  male MRN: [id number]  Unit/Bed#: ED 11 Encounter: [id number]      Assessment/Plan     Kat Crawley is a 63 y o  male with abdominal pain and constipation; POD0 s/p laparoscopic umbilical and epigastric hernia repair with mesh  - Patient presented to ED due to postoperative abdominal pain and reports the provider at the correctional facility said he had no bowel sounds  - AVSS, mild abdominal pain on exam, bowel sounds normal, incisions c/d/i    Plan:  · Patient is stable and abdominal pain is as expected postoperatively  Bowel sounds are normal  Okay for one time dose of toradol and dc with tylenol 650mg PO PRN  · Continue regular diet as tolerated  · No indication for imaging or labs at this time  · Educated patient on diagnosis and treatment for his postoperative pain  · Continue colace prn for constipation  · Follow up for postoperative appointment with Dr Alysha Tang as scheduled  History of Present Illness     HPI:  Kat Crawley is a 63 y o  male who presents with abdominal pain and constipation s/p laparoscopic umbilical and epigastric hernia repair this morning  The patient reports his pain is what he expected and he has associated nausea however he came to the ED because the provider at the correctional facility said there were no bowel sounds on exam  The patient has a history of constipation for which he takes colace prn  His last BM was yesterday morning and it was normal  His abdominal pain is manageable  Denies CP, SOB, fever, chills, headache, vomiting, diarrhea  No allergies  Review of Systems   Constitutional: Negative for chills and fever  HENT: Negative for ear pain and sore throat  Eyes: Negative for pain and visual disturbance  Respiratory: Negative for cough and shortness of breath  Cardiovascular: Negative for chest pain and palpitations  Gastrointestinal: Positive for abdominal pain, constipation and nausea   Negative for vomiting  Genitourinary: Negative for dysuria and hematuria  Musculoskeletal: Negative for arthralgias and back pain  Skin: Negative for color change and rash  Neurological: Negative for seizures and syncope  All other systems reviewed and are negative  Historical Information   Past Medical History:   Diagnosis Date    Asthma      Past Surgical History:   Procedure Laterality Date    RESECTION SOFT TISSUE TUMOR THIGH RADICAL Bilateral      Social History   Social History     Substance and Sexual Activity   Alcohol Use Not Currently     Social History     Substance and Sexual Activity   Drug Use Never     Social History     Tobacco Use   Smoking Status Former Smoker    Types: Cigarettes   Smokeless Tobacco Never Used     Family History: non-contributory    Meds/Allergies   all medications and allergies reviewed  No Known Allergies    Objective   First Vitals:   Blood Pressure: 151/81 (06/22/22 1425)  Pulse: 96 (06/22/22 1425)  Temperature: 98 7 °F (37 1 °C) (06/22/22 1425)  Temp Source: Oral (06/22/22 1425)  Respirations: 18 (06/22/22 1425)  Height: 5' 8" (172 7 cm) (06/22/22 1425)  Weight - Scale: 95 3 kg (210 lb) (06/22/22 1425)  SpO2: 100 % (06/22/22 1425)    Current Vitals:   Blood Pressure: 151/81 (06/22/22 1425)  Pulse: 96 (06/22/22 1425)  Temperature: 98 7 °F (37 1 °C) (06/22/22 1425)  Temp Source: Oral (06/22/22 1425)  Respirations: 18 (06/22/22 1425)  Height: 5' 8" (172 7 cm) (06/22/22 1425)  Weight - Scale: 95 3 kg (210 lb) (06/22/22 1425)  SpO2: 100 % (06/22/22 1425)    No intake or output data in the 24 hours ending 06/22/22 1618    Invasive Devices  Report    None                 Physical Exam  Vitals reviewed  Constitutional:       General: He is not in acute distress  Appearance: Normal appearance  He is not ill-appearing  HENT:      Head: Normocephalic and atraumatic        Right Ear: External ear normal       Left Ear: External ear normal       Nose: Nose normal  Mouth/Throat:      Mouth: Mucous membranes are moist       Pharynx: No oropharyngeal exudate or posterior oropharyngeal erythema  Eyes:      General: No scleral icterus  Right eye: No discharge  Left eye: No discharge  Conjunctiva/sclera: Conjunctivae normal    Cardiovascular:      Rate and Rhythm: Normal rate and regular rhythm  Pulmonary:      Effort: Pulmonary effort is normal    Abdominal:      General: Bowel sounds are normal  There is no distension  Palpations: Abdomen is soft  There is no mass  Tenderness: There is abdominal tenderness (mild)  There is no guarding  Hernia: No hernia is present  Musculoskeletal:         General: Normal range of motion  Cervical back: Normal range of motion  Skin:     General: Skin is warm  Coloration: Skin is not jaundiced  Neurological:      General: No focal deficit present  Mental Status: He is alert and oriented to person, place, and time  Psychiatric:         Mood and Affect: Mood normal          Behavior: Behavior normal          Thought Content: Thought content normal          Judgment: Judgment normal          Lab Results: I have personally reviewed pertinent lab results  No results found for this or any previous visit (from the past 36 hour(s))  Imaging: I have personally reviewed pertinent reports  No results found  EKG, Pathology, and Other Studies: I have personally reviewed pertinent reports

## 2022-06-22 NOTE — DISCHARGE INSTRUCTIONS
Take Tylenol and Norco as directed by your surgeon  You may resume a normal diet  Bowel function is expected to return in a day or so  Please call Dr Joni Robison office with any concerns

## 2022-06-22 NOTE — INTERVAL H&P NOTE
H&P reviewed  After examining the patient I find no changes in the patients condition since the H&P had been written      Vitals:    06/22/22 0657   BP: 151/89   Pulse: 57   Resp: 21   Temp: (!) 97 3 °F (36 3 °C)   SpO2: 97%

## 2022-07-15 ENCOUNTER — OFFICE VISIT (OUTPATIENT)
Dept: SURGERY | Facility: CLINIC | Age: 62
End: 2022-07-15

## 2022-07-15 VITALS
OXYGEN SATURATION: 95 % | RESPIRATION RATE: 18 BRPM | SYSTOLIC BLOOD PRESSURE: 124 MMHG | DIASTOLIC BLOOD PRESSURE: 80 MMHG | HEIGHT: 68 IN | WEIGHT: 206.3 LBS | BODY MASS INDEX: 31.27 KG/M2 | TEMPERATURE: 98.4 F | HEART RATE: 69 BPM

## 2022-07-15 DIAGNOSIS — Z87.19 S/P LAPAROSCOPIC HERNIA REPAIR: Primary | ICD-10-CM

## 2022-07-15 DIAGNOSIS — Z98.890 S/P LAPAROSCOPIC HERNIA REPAIR: Primary | ICD-10-CM

## 2022-07-15 PROCEDURE — 99024 POSTOP FOLLOW-UP VISIT: CPT | Performed by: SURGERY

## 2022-07-15 NOTE — PROGRESS NOTES
Assessment/Plan:     1  S/P laparoscopic hernia repair      Recovering quite well  Cautioned to continue lifting restriction for the full 6 weeks  He can do cardio and use his body weight in exercise but should not add additional weight over 30 pounds  F/u prn  Subjective:      Patient ID: Allyson Escobar is a 63 y o  male  Triage Notes:    Mr Amanda Godfrey is seen in followup 3 weeks after lap ventral hernia repair (two defects - epigastric and umbilical)  He reports doing well  Appetite is normal  No constipation  Good energy  Avoiding heavy weight lifting  The following portions of the patient's history were reviewed and updated as appropriate: allergies, current medications, past family history, past medical history, past social history, past surgical history and problem list     Review of Systems      Objective:      /80 (BP Location: Left arm, Patient Position: Sitting, Cuff Size: Adult)   Pulse 69   Temp 98 4 °F (36 9 °C) (Oral)   Resp 18   Ht 5' 8" (1 727 m)   Wt 93 6 kg (206 lb 4 8 oz)   SpO2 95%   BMI 31 37 kg/m²     Below is the patient's most recent value for Albumin, ALT, AST, BUN, Calcium, Chloride, Cholesterol, CO2, Creatinine, GFR, Glucose, HDL, Hematocrit, Hemoglobin, Hemoglobin A1C, LDL, Magnesium, Phosphorus, Platelets, Potassium, PSA, Sodium, Triglycerides, and WBC  No results found for: ALT, AST, BUN, CALCIUM, CL, CHOL, CO2, CREATININE, CREAT, GFRAA, GFRNONAA, HDL, HCT, HGB, HGBA1C, LDL, MG, PHOS, PLT, K, PSA, NA, TRIG, WBC  Note: for a comprehensive list of the patient's lab results, access the Results Review activity  Physical Exam  Vitals and nursing note reviewed  Constitutional:       General: He is not in acute distress  Appearance: He is well-developed  HENT:      Head: Normocephalic and atraumatic  Eyes:      General:         Right eye: No discharge  Left eye: No discharge  Neck:      Vascular: No JVD     Cardiovascular:      Rate and Rhythm: Normal rate and regular rhythm  Pulmonary:      Effort: Pulmonary effort is normal       Breath sounds: Normal breath sounds  Abdominal:      General: There is no distension  Palpations: Abdomen is soft  Tenderness: There is no abdominal tenderness  There is no guarding  Hernia: No hernia is present  Comments: Port sites and midline are clean and dry  No erythema or drainage  Musculoskeletal:         General: Normal range of motion  Cervical back: Normal range of motion and neck supple  Skin:     General: Skin is warm and dry  Neurological:      Mental Status: He is alert and oriented to person, place, and time  Psychiatric:         Mood and Affect: Mood normal          Behavior: Behavior normal          Thought Content:  Thought content normal          Judgment: Judgment normal              Procedures

## (undated) DEVICE — TROCAR: Brand: KII FIOS FIRST ENTRY

## (undated) DEVICE — GAUZE SPONGES,8 PLY: Brand: CURITY

## (undated) DEVICE — CHLORAPREP HI-LITE 26ML ORANGE

## (undated) DEVICE — INTENDED FOR TISSUE SEPARATION, AND OTHER PROCEDURES THAT REQUIRE A SHARP SURGICAL BLADE TO PUNCTURE OR CUT.: Brand: BARD-PARKER SAFETY BLADES SIZE 11, STERILE

## (undated) DEVICE — DRESSING MEPILEX AG BORDER 3 X 3 IN

## (undated) DEVICE — PAD GROUNDING ADULT

## (undated) DEVICE — GLOVE SRG BIOGEL 7

## (undated) DEVICE — DRAPE EQUIPMENT RF WAND

## (undated) DEVICE — COTTON TIP APPLICTOR 2 PK

## (undated) DEVICE — ELECTRODE LAP L WIRE E-Z CLEAN 33CM -0100

## (undated) DEVICE — TUBING SMOKE EVAC W/FILTRATION DEVICE PLUMEPORT ACTIV

## (undated) DEVICE — PMI DISPOSABLE PUNCTURE CLOSURE DEVICE / SUTURE GRASPER: Brand: PMI

## (undated) DEVICE — TROCAR: Brand: KII® SLEEVE

## (undated) DEVICE — ENDOPATH 5MM CURVED SCISSORS WITH MONOPOLAR CAUTERY: Brand: ENDOPATH

## (undated) DEVICE — SUT MONOCRYL 4-0 PS-2 18 IN Y496G

## (undated) DEVICE — ALLENTOWN LAP CHOLE APP PACK: Brand: CARDINAL HEALTH

## (undated) DEVICE — 3M™ TEGADERM™ TRANSPARENT FILM DRESSING FRAME STYLE, 1624W, 2-3/8 IN X 2-3/4 IN (6 CM X 7 CM), 100/CT 4CT/CASE: Brand: 3M™ TEGADERM™

## (undated) DEVICE — SUT NUROLON 0 CTX C551D

## (undated) DEVICE — 3M™ STERI-STRIP™ REINFORCED ADHESIVE SKIN CLOSURES, R1546, 1/4 IN X 4 IN (6 MM X 100 MM), 10 STRIPS/ENVELOPE: Brand: 3M™ STERI-STRIP™

## (undated) DEVICE — HYDROPHILIC WOUND DRESSING WITH ZINC PLUS VITAMINS A AND B6.: Brand: DERMAGRAN®-B

## (undated) DEVICE — ENDOPATH PNEUMONEEDLE INSUFFLATION NEEDLES WITH LUER LOCK CONNECTORS 120MM: Brand: ENDOPATH

## (undated) DEVICE — LIGHT HANDLE COVER SLEEVE DISP BLUE STELLAR

## (undated) DEVICE — [HIGH FLOW INSUFFLATOR,  DO NOT USE IF PACKAGE IS DAMAGED,  KEEP DRY,  KEEP AWAY FROM SUNLIGHT,  PROTECT FROM HEAT AND RADIOACTIVE SOURCES.]: Brand: PNEUMOSURE